# Patient Record
Sex: FEMALE | Race: WHITE | NOT HISPANIC OR LATINO | Employment: UNEMPLOYED | URBAN - METROPOLITAN AREA
[De-identification: names, ages, dates, MRNs, and addresses within clinical notes are randomized per-mention and may not be internally consistent; named-entity substitution may affect disease eponyms.]

---

## 2023-11-10 ENCOUNTER — TELEPHONE (OUTPATIENT)
Dept: OTOLARYNGOLOGY | Facility: CLINIC | Age: 63
End: 2023-11-10
Payer: COMMERCIAL

## 2023-11-10 DIAGNOSIS — H60.509 ACUTE OTITIS EXTERNA, UNSPECIFIED LATERALITY, UNSPECIFIED TYPE: ICD-10-CM

## 2023-11-10 DIAGNOSIS — R09.81 NASAL CONGESTION: Primary | ICD-10-CM

## 2023-11-10 DIAGNOSIS — H92.10 OTORRHEA, UNSPECIFIED LATERALITY: ICD-10-CM

## 2023-11-10 RX ORDER — CIPROFLOXACIN AND DEXAMETHASONE 3; 1 MG/ML; MG/ML
SUSPENSION/ DROPS AURICULAR (OTIC)
Qty: 7.5 ML | Refills: 1 | Status: SHIPPED | OUTPATIENT
Start: 2023-11-10

## 2023-11-10 RX ORDER — FLUTICASONE PROPIONATE 50 MCG
SPRAY, SUSPENSION (ML) NASAL
Qty: 1 ML | Refills: 11 | Status: SHIPPED | OUTPATIENT
Start: 2023-11-10

## 2023-11-10 NOTE — TELEPHONE ENCOUNTER
Patient of Dr. Godfrey's last seen 8/2022, S/P septo/turbs and left tympanoplasty with graft. Called to request refill of flonase and rx for ear drops. States she woke up with right ear pain, no drainage. She has a lot of sinus congestion. She had TM retraction of the right ear at her last visit. She has used ciprodex in the past. ALL: amoxicillin and darvocet.

## 2023-12-15 ENCOUNTER — APPOINTMENT (OUTPATIENT)
Dept: PAIN MEDICINE | Facility: HOSPITAL | Age: 63
End: 2023-12-15
Payer: COMMERCIAL

## 2023-12-29 ENCOUNTER — APPOINTMENT (OUTPATIENT)
Dept: PAIN MEDICINE | Facility: HOSPITAL | Age: 63
End: 2023-12-29
Payer: COMMERCIAL

## 2024-02-26 ENCOUNTER — APPOINTMENT (OUTPATIENT)
Dept: RADIOLOGY | Facility: CLINIC | Age: 64
End: 2024-02-26
Payer: COMMERCIAL

## 2024-07-29 ENCOUNTER — APPOINTMENT (OUTPATIENT)
Dept: RADIOLOGY | Facility: CLINIC | Age: 64
End: 2024-07-29
Payer: COMMERCIAL

## 2024-09-10 ENCOUNTER — APPOINTMENT (OUTPATIENT)
Dept: AUDIOLOGY | Facility: CLINIC | Age: 64
End: 2024-09-10
Payer: COMMERCIAL

## 2024-09-10 ENCOUNTER — APPOINTMENT (OUTPATIENT)
Dept: OTOLARYNGOLOGY | Facility: CLINIC | Age: 64
End: 2024-09-10
Payer: COMMERCIAL

## 2025-05-19 ENCOUNTER — APPOINTMENT (OUTPATIENT)
Dept: RADIOLOGY | Facility: CLINIC | Age: 65
End: 2025-05-19
Payer: COMMERCIAL